# Patient Record
(demographics unavailable — no encounter records)

---

## 2025-02-09 NOTE — HISTORY OF PRESENT ILLNESS
[FreeTextEntry1] : 60-year-old female with recent changes in bowel habits.  Symptoms have been present for several months but worsening.  She has not had a colonoscopy in over a decade

## 2025-02-09 NOTE — PHYSICAL EXAM
[Abdomen Masses] : No abdominal masses [Abdomen Tenderness] : ~T No ~M abdominal tenderness [JVD] : no jugular venous distention  [Normal Breath Sounds] : Normal breath sounds [Normal Heart Sounds] : normal heart sounds [Normal Rate and Rhythm] : normal rate and rhythm [No Rash or Lesion] : No rash or lesion [Alert] : alert [Oriented to Person] : oriented to person [Oriented to Place] : oriented to place [Oriented to Time] : oriented to time [Calm] : calm [de-identified] : Looks well in no distress, of stated age.

## 2025-02-09 NOTE — ASSESSMENT
[FreeTextEntry1] : 60-year-old female with changes in bowel habits.  Recommend colonoscopy. Risks and benefits of colonoscopy have been discussed which include but not limited to bleeding, perforation, missing a cancer or polyp occurring 5%.  Recommend high fiber diet, Metamucil daily, sitz baths, stool softeners, pain medications p.r.n.